# Patient Record
Sex: MALE | Race: WHITE | Employment: FULL TIME | URBAN - NONMETROPOLITAN AREA
[De-identification: names, ages, dates, MRNs, and addresses within clinical notes are randomized per-mention and may not be internally consistent; named-entity substitution may affect disease eponyms.]

---

## 2019-05-30 ENCOUNTER — HOSPITAL ENCOUNTER (EMERGENCY)
Age: 29
Discharge: HOME | End: 2019-05-30

## 2019-05-30 VITALS
RESPIRATION RATE: 18 BRPM | SYSTOLIC BLOOD PRESSURE: 138 MMHG | OXYGEN SATURATION: 96 % | DIASTOLIC BLOOD PRESSURE: 78 MMHG | TEMPERATURE: 98.6 F | HEART RATE: 87 BPM | BODY MASS INDEX: 29.69 KG/M2 | WEIGHT: 225 LBS

## 2019-05-30 DIAGNOSIS — T15.91XA FOREIGN BODY OF RIGHT EYE, INITIAL ENCOUNTER: Primary | ICD-10-CM

## 2019-05-30 PROCEDURE — 6360000002 HC RX W HCPCS: Performed by: NURSE PRACTITIONER

## 2019-05-30 PROCEDURE — 65205 REMOVE FOREIGN BODY FROM EYE: CPT | Performed by: NURSE PRACTITIONER

## 2019-05-30 PROCEDURE — 65205 REMOVE FOREIGN BODY FROM EYE: CPT

## 2019-05-30 PROCEDURE — 6370000000 HC RX 637 (ALT 250 FOR IP): Performed by: NURSE PRACTITIONER

## 2019-05-30 PROCEDURE — 90471 IMMUNIZATION ADMIN: CPT | Performed by: NURSE PRACTITIONER

## 2019-05-30 PROCEDURE — 99203 OFFICE O/P NEW LOW 30 MIN: CPT | Performed by: NURSE PRACTITIONER

## 2019-05-30 PROCEDURE — 90715 TDAP VACCINE 7 YRS/> IM: CPT | Performed by: NURSE PRACTITIONER

## 2019-05-30 PROCEDURE — 99202 OFFICE O/P NEW SF 15 MIN: CPT

## 2019-05-30 RX ORDER — PROPARACAINE HYDROCHLORIDE 5 MG/ML
2 SOLUTION/ DROPS OPHTHALMIC ONCE
Status: COMPLETED | OUTPATIENT
Start: 2019-05-30 | End: 2019-05-30

## 2019-05-30 RX ORDER — SOFT LENS RINSE,STORE SOLUTION
2 SOLUTION, NON-ORAL MISCELLANEOUS ONCE
Status: COMPLETED | OUTPATIENT
Start: 2019-05-30 | End: 2019-05-30

## 2019-05-30 RX ORDER — TOBRAMYCIN 3 MG/ML
1 SOLUTION/ DROPS OPHTHALMIC EVERY 6 HOURS
Qty: 1 BOTTLE | Refills: 0 | Status: SHIPPED | OUTPATIENT
Start: 2019-05-30 | End: 2019-06-06

## 2019-05-30 RX ADMIN — Medication 2 DROP: at 18:58

## 2019-05-30 RX ADMIN — PROPARACAINE HYDROCHLORIDE 2 DROP: 5 SOLUTION/ DROPS OPHTHALMIC at 18:58

## 2019-05-30 RX ADMIN — TETANUS TOXOID, REDUCED DIPHTHERIA TOXOID AND ACELLULAR PERTUSSIS VACCINE, ADSORBED 0.5 ML: 5; 2.5; 8; 8; 2.5 SUSPENSION INTRAMUSCULAR at 18:40

## 2019-05-30 ASSESSMENT — ENCOUNTER SYMPTOMS
ABDOMINAL PAIN: 0
EYE WATERING: 1
EYE DISCHARGE: 1
EYE PAIN: 1
VOMITING: 0
BLIND SPOTS: 0
CRUSTING: 1
SHORTNESS OF BREATH: 0
BLURRED VISION: 1
SORE THROAT: 0
EYE INFLAMMATION: 0
NAUSEA: 0
EYE REDNESS: 1
PHOTOPHOBIA: 1
EYE ITCHING: 0
PERI-ORBITAL EDEMA: 0

## 2019-05-30 ASSESSMENT — PAIN SCALES - GENERAL: PAINLEVEL_OUTOF10: 10

## 2019-05-30 ASSESSMENT — PAIN DESCRIPTION - LOCATION: LOCATION: EYE

## 2019-05-30 ASSESSMENT — PAIN DESCRIPTION - PAIN TYPE: TYPE: ACUTE PAIN

## 2019-05-30 ASSESSMENT — PAIN DESCRIPTION - DESCRIPTORS: DESCRIPTORS: BURNING

## 2019-05-30 ASSESSMENT — PAIN DESCRIPTION - ORIENTATION: ORIENTATION: RIGHT

## 2019-05-30 NOTE — ED NOTES
Pt. Released in stable condition, ambulated per self to private car. Instructed pt to follow-up with [facility] for recheck or go directly to the emergency department for any concerns/worsening conditions. Pt. Verbalized understanding of instructions. No questions at this time. RX in hand.       Yuniel Mcgee RN  05/30/19 8891

## 2019-05-30 NOTE — ED TRIAGE NOTES
Pt walked to room 6. Pt here with complaints of right eye irritated. 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 Happened yesterday around noon. Pt wears contacts, but he has them out. Right eye sensitive to light.

## 2019-05-30 NOTE — ED PROVIDER NOTES
[facility]  [facility]      CHIEFCOMPLAINT       Chief Complaint   Patient presents with    Eye Problem     Right eye irritation. Pt works with [id number] and thinks maybe got a piece in right eye. Eye sensitive to light, watery and blurry. Nurses Notes reviewed and I agree except as noted in the HPI. HISTORY OF PRESENT ILLNESS   Andre Gallardo is a 33 y.o. male who presents:  Last tetanus 5-6 years ago. The history is provided by the patient. Eye Problem   Location:  Right eye  Quality:  Foreign body sensation  Severity:  Severe  Onset quality:  Sudden  Duration:  1 day (Yesterday after work he felt irritation to his right eye he did use eyewash with no improvement. 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.)  Timing:  Constant  Progression:  Unchanged  Chronicity:  New  Context: contact lenses and foreign body    Foreign body:  Unknown  Relieved by:  Nothing  Worsened by:  Bright light ( he did remove his eye contact)  Ineffective treatments:  Flushing and sunglasses  Associated symptoms: blurred vision, crusting, discharge, photophobia, redness and tearing    Associated symptoms: no headaches, no inflammation, no itching, no nausea, no numbness, no scotomas, no swelling, no tingling, no vomiting and no weakness    Risk factors: no conjunctival hemorrhage, no exposure to pinkeye, no previous injury to eye and no recent URI        REVIEW OF SYSTEMS     Review of Systems   Constitutional: Negative for activity change, appetite change, chills, diaphoresis, fatigue and fever. HENT: Negative for sore throat. Eyes: Positive for blurred vision, photophobia, pain, discharge and redness. Negative for itching and visual disturbance. Respiratory: Negative for shortness of breath. Cardiovascular: Negative for chest pain. Gastrointestinal: Negative for abdominal pain, nausea and vomiting. Genitourinary: Negative for dysuria.    Musculoskeletal: Negative for arthralgias. Neurological: Negative for tingling, weakness, numbness and headaches. Psychiatric/Behavioral: The patient is not nervous/anxious. PAST MEDICAL HISTORY   History reviewed. No pertinent past medical history. SURGICAL HISTORY     Patient  has a past surgical history that includes Tympanostomy tube placement. CURRENT MEDICATIONS       Previous Medications    DOCUSATE SODIUM (COLACE) 100 MG CAPSULE    Take 1 capsule by mouth 2 times daily as needed for Constipation       ALLERGIES     Patient is has No Known Allergies. FAMILY HISTORY     Patient's family history is not on file. SOCIAL HISTORY     Patient  reports that he quit smoking about 6 months ago. His smoking use included cigarettes. He smoked 0.50 packs per day. He has never used smokeless tobacco. He reports that he drinks alcohol. He reports that he does not use drugs. PHYSICAL EXAM     ED TRIAGE VITALS  BP: 138/78, Temp: 98.6 °F (37 °C), Pulse: 87, Resp: 18, SpO2: 96 %  Physical Exam   Constitutional: He is oriented to person, place, and time. Vital signs are normal. He appears well-developed and well-nourished. Non-toxic appearance. He does not have a sickly appearance. He does not appear ill. No distress. HENT:   Head: Normocephalic and atraumatic. Head is without right periorbital erythema and without left periorbital erythema. Right Ear: Hearing and external ear normal.   Left Ear: Hearing and external ear normal.   Nose: Nose normal.   Mouth/Throat: Mucous membranes are normal.   Eyes: EOM are normal. Right eye exhibits chemosis. Right eye exhibits no discharge, no exudate and no hordeolum. Foreign body present in the right eye. Right conjunctiva is injected. Right conjunctiva has no hemorrhage. Left conjunctiva is not injected. No scleral icterus. Right pupil is round and reactive. Left pupil is round and reactive. Pupils are equal.   Slit lamp exam:       The right eye shows fluorescein uptake.  The right eye shows no corneal abrasion and no corneal ulcer. Right eye was examined. A gray foreign body found. 2 gtt's of Alcaine in the eye. Eye stained with fluorescin dye. No corneal abrasion detected as per picture. Eye irrigated with eye wash. Patient tolerated well. Denies pain after procedure. Used 18 G beveled end of needle with flicking motion was able to remove f.b. No abrasion or ulcer noted. Neck: Normal range of motion. Cardiovascular: Normal rate, regular rhythm and normal heart sounds. No murmur heard. Pulmonary/Chest: Effort normal and breath sounds normal. No respiratory distress. Musculoskeletal:        Right knee: He exhibits normal range of motion. Left knee: He exhibits normal range of motion. Lymphadenopathy:     He has no cervical adenopathy. Neurological: He is alert and oriented to person, place, and time. No sensory deficit. Skin: Skin is warm, dry and intact. No rash noted. He is not diaphoretic. No cyanosis or erythema. No pallor. No obvious signs of infection or trauma. Psychiatric: He has a normal mood and affect. His behavior is normal.   Nursing note and vitals reviewed. DIAGNOSTIC RESULTS   Labs:No results found for this visit on 05/30/19. IMAGING:    URGENT CARE COURSE:     Vitals:    05/30/19 1808   BP: 138/78   Pulse: 87   Resp: 18   Temp: 98.6 °F (37 °C)   TempSrc: Oral   SpO2: 96%   Weight: 225 lb (102.1 kg)       Medications   proparacaine (ALCAINE) 0.5 % ophthalmic solution 2 drop (has no administration in time range)   eye wash ophthalmic solution 2 drop (has no administration in time range)   Tetanus-Diphth-Acell Pertussis (BOOSTRIX) injection 0.5 mL (0.5 mLs Intramuscular Given 5/30/19 1840)     PROCEDURES:  None  FINAL IMPRESSION       1.  Foreign body of right eye, initial encounter        DISPOSITION/PLAN   DISPOSITION Decision To Discharge 05/30/2019 06:34:14 PM  Ibuprofen for pain as needed  Wear sunglasses for light sensitivity   Take antibiotics like prescribed    PATIENT REFERRED TO:  Deanna Turcios MD  [address] [address]  [phone]    Call in 1 day  For appointment     Patient instructed to follow up with PCP. If symptoms worsen, become severe or new symptoms develop patient instructedto go to the emergency room immediately. DISCHARGE MEDICATIONS:  New Prescriptions    TOBRAMYCIN (TOBREX) 0.3 % OPHTHALMIC SOLUTION    Place 1 drop into the right eye every 6 hours for 7 days     Current Discharge Medication List          Patient giveneducational materials - see patient instructions. Discussed use, benefit, and side effects of prescribed medications. All patient questions answered. Pt voiced understanding. Reviewed health maintenance. Patient agreedwith treatment plan. Follow up as directed.      PIERRE Fernández - PIERRE Hassan CNP  05/30/19 5250

## 2022-03-28 ENCOUNTER — HOSPITAL ENCOUNTER (OUTPATIENT)
Age: 32
Discharge: HOME | End: 2022-03-28

## 2022-03-28 ENCOUNTER — HOSPITAL ENCOUNTER (OUTPATIENT)
Dept: GENERAL RADIOLOGY | Age: 32
Discharge: HOME | End: 2022-03-28

## 2022-03-28 DIAGNOSIS — S50.01XA CONTUSION OF RIGHT ELBOW, INITIAL ENCOUNTER: ICD-10-CM

## 2022-11-26 ENCOUNTER — APPOINTMENT (OUTPATIENT)
Dept: GENERAL RADIOLOGY | Age: 32
End: 2022-11-26

## 2022-11-26 ENCOUNTER — HOSPITAL ENCOUNTER (EMERGENCY)
Age: 32
Discharge: HOME | End: 2022-11-26

## 2022-11-26 VITALS
HEART RATE: 60 BPM | DIASTOLIC BLOOD PRESSURE: 84 MMHG | OXYGEN SATURATION: 99 % | BODY MASS INDEX: 29.03 KG/M2 | RESPIRATION RATE: 14 BRPM | SYSTOLIC BLOOD PRESSURE: 120 MMHG | TEMPERATURE: 97.5 F | WEIGHT: 220 LBS

## 2022-11-26 DIAGNOSIS — S80.12XA CONTUSION OF LEFT LOWER LEG, INITIAL ENCOUNTER: Primary | ICD-10-CM

## 2022-11-26 PROCEDURE — 73590 X-RAY EXAM OF LOWER LEG: CPT

## 2022-11-26 PROCEDURE — 99213 OFFICE O/P EST LOW 20 MIN: CPT

## 2022-11-26 PROCEDURE — 99212 OFFICE O/P EST SF 10 MIN: CPT | Performed by: NURSE PRACTITIONER

## 2022-11-26 RX ORDER — IBUPROFEN 400 MG/1
400 TABLET ORAL EVERY 6 HOURS PRN
Qty: 120 TABLET | Refills: 0 | Status: SHIPPED | OUTPATIENT
Start: 2022-11-26

## 2022-11-26 ASSESSMENT — ENCOUNTER SYMPTOMS
STRIDOR: 0
COLOR CHANGE: 1
COUGH: 0
APNEA: 0
CHEST TIGHTNESS: 0
SHORTNESS OF BREATH: 0
WHEEZING: 0
CHOKING: 0
BACK PAIN: 0

## 2022-11-26 ASSESSMENT — PAIN DESCRIPTION - LOCATION: LOCATION: LEG

## 2022-11-26 ASSESSMENT — PAIN - FUNCTIONAL ASSESSMENT: PAIN_FUNCTIONAL_ASSESSMENT: 0-10

## 2022-11-26 ASSESSMENT — PAIN DESCRIPTION - ORIENTATION: ORIENTATION: LEFT

## 2022-11-26 ASSESSMENT — PAIN DESCRIPTION - FREQUENCY: FREQUENCY: INTERMITTENT

## 2022-11-26 ASSESSMENT — PAIN SCALES - GENERAL: PAINLEVEL_OUTOF10: 10

## 2022-11-26 NOTE — DISCHARGE INSTRUCTIONS
Compression with Wrap/shin guard  Ice, elevation 15-20 minutes 3 times a day for the first 24-48 hours followed with heat 15-20 minutes 3 times a day thereafter. Activity as tolerated.     Take medication as directed  Return for worsening symptoms, otherwise if symptoms persist follow up orthopedics in 1 to 3 days

## 2022-11-26 NOTE — ED PROVIDER NOTES
[facility]  Urgent Care Encounter      CHIEF COMPLAINT       Chief Complaint   Patient presents with    Leg Pain     Left shin       Nurses Notes reviewed and I agree except as noted in the HPI. HISTORY OFPRESENT ILLNESS   Mark Oliver is a 34 y.o. The history is provided by the patient. No  was used. Leg Injury  Location:  Leg  Injury: yes    Mechanism of injury: crush and fall    Crush:     Mechanism:  Falling object (missed rung on ladder, leg went between rungs)  Fall:     Fall occurred:  From a ladder  Leg location:  L lower leg  Pain details:     Quality:  Aching    Radiates to:  Does not radiate    Severity:  Moderate    Onset quality:  Gradual    Duration:  5 days    Timing:  Constant    Progression:  Waxing and waning  Chronicity:  New  Dislocation: no    Foreign body present:  No foreign bodies  Tetanus status:  Unknown  Prior injury to area:  No  Relieved by:  Nothing  Worsened by:  Bearing weight and activity  Ineffective treatments:  Ice and heat  Associated symptoms: numbness and swelling    Associated symptoms: no back pain, no decreased ROM, no fatigue, no fever, no itching, no muscle weakness, no neck pain, no stiffness and no tingling    Risk factors: no concern for non-accidental trauma, no frequent fractures, no known bone disorder, no obesity and no recent illness      REVIEW OF SYSTEMS     Review of Systems   Constitutional:  Positive for activity change. Negative for appetite change, chills, diaphoresis, fatigue and fever. Respiratory:  Negative for apnea, cough, choking, chest tightness, shortness of breath, wheezing and stridor. Cardiovascular:  Negative for chest pain, palpitations and leg swelling. Musculoskeletal:  Positive for gait problem, joint swelling and myalgias. Negative for back pain, neck pain and stiffness. Skin:  Positive for color change. Negative for itching. PAST MEDICAL HISTORY   History reviewed.  No pertinent past medical history. SURGICAL HISTORY     Patient  has a past surgical history that includes Tympanostomy tube placement. CURRENT MEDICATIONS       Discharge Medication List as of 11/26/2022 11:21 AM          ALLERGIES     Patient is has No Known Allergies. FAMILY HISTORY     Patient's family history is not on file. SOCIAL HISTORY     Patient  reports that he quit smoking about 4 years ago. His smoking use included cigarettes. He smoked an average of .5 packs per day. His smokeless tobacco use includes chew. He reports current alcohol use. He reports that he does not use drugs. PHYSICAL EXAM     ED TRIAGE VITALS  BP: 120/84, Temp: 97.5 °F (36.4 °C), Heart Rate: 60, Resp: 14, SpO2: 99 %  Physical Exam  Vitals and nursing note reviewed. Constitutional:       General: He is not in acute distress. Appearance: Normal appearance. He is normal weight. He is not ill-appearing, toxic-appearing or diaphoretic. HENT:      Head: Normocephalic and atraumatic. Left Ear: External ear normal.   Eyes:      Extraocular Movements: Extraocular movements intact. Conjunctiva/sclera: Conjunctivae normal.   Cardiovascular:      Pulses: Normal pulses. Dorsalis pedis pulses are 2+ on the left side. Posterior tibial pulses are 2+ on the left side. Pulmonary:      Effort: Pulmonary effort is normal.   Musculoskeletal:         General: Swelling present. Cervical back: Normal range of motion. Left lower leg: Swelling and tenderness present. No deformity, lacerations or bony tenderness. No edema. Legs:    Skin:     Findings: Erythema present. Neurological:      General: No focal deficit present. Mental Status: He is alert and oriented to person, place, and time. Psychiatric:         Mood and Affect: Mood normal.         Behavior: Behavior normal.         Thought Content:  Thought content normal.         Judgment: Judgment normal.       DIAGNOSTIC RESULTS Labs:No results found for this visit on 11/26/22. IMAGING:  XR TIBIA FIBULA LEFT (2 VIEWS)   Final Result    No fracture. **This report has been created using voice recognition software. It may contain minor errors which are inherent in voice recognition technology. **      Final report electronically signed by Dr. Jaswant Wang on 11/26/2022 11:09 AM        URGENT CARE COURSE:     Vitals:    11/26/22 1032   BP: 120/84   Pulse: 60   Resp: 14   Temp: 97.5 °F (36.4 °C)   TempSrc: Temporal   SpO2: 99%   Weight: 220 lb (99.8 kg)       Medications - No data to display  PROCEDURES:  None  FINAL IMPRESSION      1. Contusion of left lower leg, initial encounter        DISPOSITION/PLAN   Decision To Discharge  Compression with Wrap/Air Cast  Ice, elevation 15-20 minutes 3 times a day for the first 24-48 hours followed with heat 15-20 minutes 3 times a day thereafter. Activity as tolerated. Take medication as directed  Return for worsening symptoms, otherwise if symptoms persist follow up orthopedics in 1 to 3 days.       PATIENT REFERRED TO:  [address] [place] [address]. [id number]  Call today  [phone]  DISCHARGE MEDICATIONS:  Discharge Medication List as of 11/26/2022 11:21 AM        START taking these medications    Details   ibuprofen (IBU) 400 MG tablet Take 1 tablet by mouth every 6 hours as needed for Pain, Disp-120 tablet, R-0Normal           Discharge Medication List as of 11/26/2022 11:21 AM          PIERRE Dent - PIERRE Mercedes CNP  11/26/22 8762

## 2022-11-26 NOTE — ED TRIAGE NOTES
Makayla Dalton arrives to room with complaint of  left shin pain and swelling  symptoms started 5 days ago after sliding down ladder and hitting shin on Monday. Pt requesting an XRAY of the area to be sure no breaks.

## 2022-11-26 NOTE — Clinical Note
Bouchra Villalobos was seen and treated in our emergency department on 11/26/2022. He may return to work on 11/29/2022. If you have any questions or concerns, please don't hesitate to call.       Maryana Demarco, APRN - CNP

## 2023-12-12 ENCOUNTER — HOSPITAL ENCOUNTER (EMERGENCY)
Age: 33
Discharge: HOME | End: 2023-12-12
Payer: COMMERCIAL

## 2023-12-12 VITALS
SYSTOLIC BLOOD PRESSURE: 151 MMHG | HEART RATE: 67 BPM | OXYGEN SATURATION: 99 % | RESPIRATION RATE: 17 BRPM | DIASTOLIC BLOOD PRESSURE: 99 MMHG | TEMPERATURE: 98.6 F

## 2023-12-12 DIAGNOSIS — S06.0XAA CLOSED HEAD INJURY WITH CONCUSSION, WITH UNKNOWN LOSS OF CONSCIOUSNESS STATUS, INITIAL ENCOUNTER: Primary | ICD-10-CM

## 2023-12-12 PROCEDURE — 99213 OFFICE O/P EST LOW 20 MIN: CPT

## 2023-12-12 PROCEDURE — 99213 OFFICE O/P EST LOW 20 MIN: CPT | Performed by: NURSE PRACTITIONER

## 2023-12-12 ASSESSMENT — ENCOUNTER SYMPTOMS
NAUSEA: 1
SORE THROAT: 0
DIARRHEA: 0
COUGH: 0
TROUBLE SWALLOWING: 0
VOMITING: 0
EYE DISCHARGE: 0
EYE REDNESS: 0
RHINORRHEA: 0
SHORTNESS OF BREATH: 0
PHOTOPHOBIA: 1

## 2023-12-12 ASSESSMENT — PAIN - FUNCTIONAL ASSESSMENT: PAIN_FUNCTIONAL_ASSESSMENT: NONE - DENIES PAIN

## 2023-12-12 NOTE — ED TRIAGE NOTES
Pt arrival to the urgent care with complaint of hitting his head on a nail gun that did not recoil yesterday at work and swung and hit his left forehead. Pt tried to go to work today and states he got very dizzy when he bent over. Denies pain. States this is a workers comp. Pt has small abrasion to the left forehead. Pt is aware of photo ID needed for drug screen and BAT if needed. Pt is alert and oriented and breathing with ease. Resting in chair.

## 2023-12-12 NOTE — ED NOTES
Patient in stable condition. Alert and oriented x3. Unlabored breathing present. Patient aware of plan of care. Patient discharge instructions given and explained. Follow up information instructions given. Patient agreeable to plan of care. Patient states understanding and denies any questions or concerns. Patient ambulated out of UC  with no complications.         Brennan Barlow RN  12/12/23 6499

## 2023-12-13 NOTE — ED PROVIDER NOTES
Medications - No data to display  PROCEDURES:  None  FINAL IMPRESSION      1. Closed head injury with concussion, with unknown loss of consciousness status, initial encounter        DISPOSITION/PLAN   DISPOSITION Decision To Discharge 12/12/2023 05:18:09 PM    Patient presents for evaluation of a closed head injury after being struck by a piece of metal equipment. Possible mild concussion. Patient placed on restrictions as of tomorrow. Follow-up with occupational health released to full duty. Recommended a sedentary job. Advised increase fluids, rest.  Over-the-counter medicine for headache. If symptoms worsen go to ER. PATIENT REFERRED TO:  [address] [place], [place] [id number]  [phone]    Follow-up as scheduled, sooner if needed. Over-the-counter medicine for headache. Increase fluids, rest.  Limit screen time. Limit caffeine. If symptoms worsen go to ER.     DISCHARGE MEDICATIONS:  Discharge Medication List as of 12/12/2023  5:19 PM        Discharge Medication List as of 12/12/2023  5:19 PM          PIERRE Theodore CNP, APRN - CNP  12/12/23 1942       PIERRE Theodore CNP  12/12/23 1943

## 2024-03-25 ENCOUNTER — HOSPITAL ENCOUNTER (EMERGENCY)
Age: 34
Discharge: HOME | End: 2024-03-25

## 2024-03-25 VITALS
SYSTOLIC BLOOD PRESSURE: 131 MMHG | HEIGHT: 73 IN | OXYGEN SATURATION: 97 % | BODY MASS INDEX: 29.16 KG/M2 | HEART RATE: 99 BPM | RESPIRATION RATE: 16 BRPM | DIASTOLIC BLOOD PRESSURE: 76 MMHG | WEIGHT: 220 LBS | TEMPERATURE: 98.5 F

## 2024-03-25 DIAGNOSIS — R09.81 SINUS CONGESTION: Primary | ICD-10-CM

## 2024-03-25 PROCEDURE — 99213 OFFICE O/P EST LOW 20 MIN: CPT

## 2024-03-25 RX ORDER — ACETAMINOPHEN AND CHLORPHENIRAMINE MALEATE 325; 2 MG/1; MG/1
2 TABLET, FILM COATED ORAL EVERY 4 HOURS PRN
Qty: 80 TABLET | Refills: 0 | Status: SHIPPED | OUTPATIENT
Start: 2024-03-25 | End: 2024-04-01

## 2024-03-25 RX ORDER — FLUTICASONE PROPIONATE 50 MCG
2 SPRAY, SUSPENSION (ML) NASAL DAILY
Qty: 16 G | Refills: 0 | Status: SHIPPED | OUTPATIENT
Start: 2024-03-25

## 2024-03-25 RX ORDER — LORATADINE 10 MG/1
10 TABLET ORAL DAILY
Qty: 30 TABLET | Refills: 0 | Status: SHIPPED | OUTPATIENT
Start: 2024-03-25 | End: 2024-04-24

## 2024-03-25 ASSESSMENT — PAIN - FUNCTIONAL ASSESSMENT: PAIN_FUNCTIONAL_ASSESSMENT: NONE - DENIES PAIN

## 2024-03-25 ASSESSMENT — ENCOUNTER SYMPTOMS
SINUS PRESSURE: 1
DIARRHEA: 0
SHORTNESS OF BREATH: 0
COUGH: 0
ABDOMINAL PAIN: 0
VOMITING: 0
WHEEZING: 0

## 2024-03-25 NOTE — ED PROVIDER NOTES
[facility] URGENT CARE  Urgent Care Encounter      CHIEF COMPLAINT       Chief Complaint   Patient presents with    Headache    Chills    Nasal Congestion       Nurses Notes reviewed and I agree except as noted in the HPI.  HISTORY OF PRESENT ILLNESS   Issac Arroyo is a 33 y.o. male who presents to urgent care with plaints of headache, chills, congestion.  Patient reports symptoms have been ongoing for 1 day.  Patient reports he took ibuprofen at home for his symptoms. Patient denies fevers, shortness of breath, chest pain.  Patient denies being around anyone sick recently.    REVIEW OF SYSTEMS     Review of Systems   Constitutional:  Positive for chills. Negative for fatigue and fever.   HENT:  Positive for congestion and sinus pressure.    Respiratory:  Negative for cough, shortness of breath and wheezing.    Cardiovascular:  Negative for chest pain.   Gastrointestinal:  Negative for abdominal pain, diarrhea and vomiting.   Musculoskeletal:  Negative for arthralgias.   Neurological:  Positive for headaches. Negative for seizures.       PAST MEDICAL HISTORY   History reviewed. No pertinent past medical history.    SURGICAL HISTORY     Patient  has a past surgical history that includes Tympanostomy tube placement.    CURRENT MEDICATIONS       Previous Medications    IBUPROFEN (IBU) 400 MG TABLET    Take 1 tablet by mouth every 6 hours as needed for Pain       ALLERGIES     Patient is has No Known Allergies.    FAMILY HISTORY     Patient'sfamily history is not on file.    SOCIAL HISTORY     Patient  reports that he quit smoking about 5 years ago. His smoking use included cigarettes. His smokeless tobacco use includes chew. He reports current alcohol use. He reports that he does not use drugs.    PHYSICAL EXAM     ED TRIAGE VITALS  BP: 131/76, Temp: 98.5 °F (36.9 °C), Pulse: 99, Respirations: 16, SpO2: 97 %  Physical Exam  Vitals and nursing note reviewed.   Constitutional:       General: He is not in